# Patient Record
Sex: FEMALE | Race: WHITE | NOT HISPANIC OR LATINO | URBAN - METROPOLITAN AREA
[De-identification: names, ages, dates, MRNs, and addresses within clinical notes are randomized per-mention and may not be internally consistent; named-entity substitution may affect disease eponyms.]

---

## 2017-11-19 ENCOUNTER — EMERGENCY (EMERGENCY)
Facility: HOSPITAL | Age: 32
LOS: 1 days | Discharge: HOME | End: 2017-11-19
Attending: EMERGENCY MEDICINE | Admitting: EMERGENCY MEDICINE
Payer: COMMERCIAL

## 2017-11-19 VITALS
TEMPERATURE: 99 F | SYSTOLIC BLOOD PRESSURE: 123 MMHG | OXYGEN SATURATION: 100 % | HEART RATE: 110 BPM | RESPIRATION RATE: 20 BRPM | DIASTOLIC BLOOD PRESSURE: 72 MMHG

## 2017-11-19 DIAGNOSIS — Z98.891 HISTORY OF UTERINE SCAR FROM PREVIOUS SURGERY: Chronic | ICD-10-CM

## 2017-11-19 LAB
ALBUMIN SERPL ELPH-MCNC: 4.7 G/DL — SIGNIFICANT CHANGE UP (ref 3.3–5)
ALP SERPL-CCNC: 42 U/L — SIGNIFICANT CHANGE UP (ref 40–120)
ALT FLD-CCNC: 14 U/L RC — SIGNIFICANT CHANGE UP (ref 10–45)
ANION GAP SERPL CALC-SCNC: 13 MMOL/L — SIGNIFICANT CHANGE UP (ref 5–17)
APPEARANCE UR: CLEAR — SIGNIFICANT CHANGE UP
AST SERPL-CCNC: 13 U/L — SIGNIFICANT CHANGE UP (ref 10–40)
BACTERIA # UR AUTO: ABNORMAL /HPF
BASOPHILS # BLD AUTO: 0 K/UL — SIGNIFICANT CHANGE UP (ref 0–0.2)
BASOPHILS NFR BLD AUTO: 0.3 % — SIGNIFICANT CHANGE UP (ref 0–2)
BILIRUB SERPL-MCNC: 0.3 MG/DL — SIGNIFICANT CHANGE UP (ref 0.2–1.2)
BILIRUB UR-MCNC: NEGATIVE — SIGNIFICANT CHANGE UP
BUN SERPL-MCNC: 15 MG/DL — SIGNIFICANT CHANGE UP (ref 7–23)
CALCIUM SERPL-MCNC: 9.4 MG/DL — SIGNIFICANT CHANGE UP (ref 8.4–10.5)
CHLORIDE SERPL-SCNC: 105 MMOL/L — SIGNIFICANT CHANGE UP (ref 96–108)
CO2 SERPL-SCNC: 22 MMOL/L — SIGNIFICANT CHANGE UP (ref 22–31)
COLOR SPEC: YELLOW — SIGNIFICANT CHANGE UP
CREAT SERPL-MCNC: 0.68 MG/DL — SIGNIFICANT CHANGE UP (ref 0.5–1.3)
DIFF PNL FLD: ABNORMAL
EOSINOPHIL # BLD AUTO: 0.1 K/UL — SIGNIFICANT CHANGE UP (ref 0–0.5)
EOSINOPHIL NFR BLD AUTO: 1.3 % — SIGNIFICANT CHANGE UP (ref 0–6)
EPI CELLS # UR: SIGNIFICANT CHANGE UP /HPF
GLUCOSE SERPL-MCNC: 100 MG/DL — HIGH (ref 70–99)
GLUCOSE UR QL: NEGATIVE — SIGNIFICANT CHANGE UP
HCG SERPL-ACNC: 1167 MIU/ML — SIGNIFICANT CHANGE UP
HCT VFR BLD CALC: 42.1 % — SIGNIFICANT CHANGE UP (ref 34.5–45)
HGB BLD-MCNC: 13.9 G/DL — SIGNIFICANT CHANGE UP (ref 11.5–15.5)
KETONES UR-MCNC: NEGATIVE — SIGNIFICANT CHANGE UP
LEUKOCYTE ESTERASE UR-ACNC: ABNORMAL
LYMPHOCYTES # BLD AUTO: 1.2 K/UL — SIGNIFICANT CHANGE UP (ref 1–3.3)
LYMPHOCYTES # BLD AUTO: 12.1 % — LOW (ref 13–44)
MCHC RBC-ENTMCNC: 30.6 PG — SIGNIFICANT CHANGE UP (ref 27–34)
MCHC RBC-ENTMCNC: 33 GM/DL — SIGNIFICANT CHANGE UP (ref 32–36)
MCV RBC AUTO: 92.6 FL — SIGNIFICANT CHANGE UP (ref 80–100)
MONOCYTES # BLD AUTO: 0.4 K/UL — SIGNIFICANT CHANGE UP (ref 0–0.9)
MONOCYTES NFR BLD AUTO: 4.1 % — SIGNIFICANT CHANGE UP (ref 2–14)
NEUTROPHILS # BLD AUTO: 8.4 K/UL — HIGH (ref 1.8–7.4)
NEUTROPHILS NFR BLD AUTO: 82.1 % — HIGH (ref 43–77)
NITRITE UR-MCNC: NEGATIVE — SIGNIFICANT CHANGE UP
PH UR: 5.5 — SIGNIFICANT CHANGE UP (ref 5–8)
PLATELET # BLD AUTO: 229 K/UL — SIGNIFICANT CHANGE UP (ref 150–400)
POTASSIUM SERPL-MCNC: 3.7 MMOL/L — SIGNIFICANT CHANGE UP (ref 3.5–5.3)
POTASSIUM SERPL-SCNC: 3.7 MMOL/L — SIGNIFICANT CHANGE UP (ref 3.5–5.3)
PROT SERPL-MCNC: 7.6 G/DL — SIGNIFICANT CHANGE UP (ref 6–8.3)
PROT UR-MCNC: SIGNIFICANT CHANGE UP
RBC # BLD: 4.55 M/UL — SIGNIFICANT CHANGE UP (ref 3.8–5.2)
RBC # FLD: 11.9 % — SIGNIFICANT CHANGE UP (ref 10.3–14.5)
RBC CASTS # UR COMP ASSIST: SIGNIFICANT CHANGE UP /HPF (ref 0–2)
SODIUM SERPL-SCNC: 140 MMOL/L — SIGNIFICANT CHANGE UP (ref 135–145)
SP GR SPEC: 1.03 — HIGH (ref 1.01–1.02)
UROBILINOGEN FLD QL: NEGATIVE — SIGNIFICANT CHANGE UP
WBC # BLD: 10.3 K/UL — SIGNIFICANT CHANGE UP (ref 3.8–10.5)
WBC # FLD AUTO: 10.3 K/UL — SIGNIFICANT CHANGE UP (ref 3.8–10.5)
WBC UR QL: SIGNIFICANT CHANGE UP /HPF (ref 0–5)

## 2017-11-19 PROCEDURE — 76817 TRANSVAGINAL US OBSTETRIC: CPT

## 2017-11-19 PROCEDURE — 99285 EMERGENCY DEPT VISIT HI MDM: CPT

## 2017-11-19 PROCEDURE — 99284 EMERGENCY DEPT VISIT MOD MDM: CPT | Mod: 25

## 2017-11-19 PROCEDURE — 80053 COMPREHEN METABOLIC PANEL: CPT

## 2017-11-19 PROCEDURE — 84702 CHORIONIC GONADOTROPIN TEST: CPT

## 2017-11-19 PROCEDURE — 81001 URINALYSIS AUTO W/SCOPE: CPT

## 2017-11-19 PROCEDURE — 85027 COMPLETE CBC AUTOMATED: CPT

## 2017-11-19 PROCEDURE — 76817 TRANSVAGINAL US OBSTETRIC: CPT | Mod: 26

## 2017-11-19 RX ORDER — SODIUM CHLORIDE 9 MG/ML
1000 INJECTION INTRAMUSCULAR; INTRAVENOUS; SUBCUTANEOUS ONCE
Qty: 0 | Refills: 0 | Status: COMPLETED | OUTPATIENT
Start: 2017-11-19 | End: 2017-11-19

## 2017-11-19 RX ADMIN — SODIUM CHLORIDE 1000 MILLILITER(S): 9 INJECTION INTRAMUSCULAR; INTRAVENOUS; SUBCUTANEOUS at 16:48

## 2017-11-19 NOTE — ED ADULT NURSE NOTE - OBJECTIVE STATEMENT
32 y.o. female presents ambulatory to ED through waiting room c/o vaginal bleeding.  32 y.o. female presents ambulatory to ED through waiting room c/o vaginal bleeding.  LMP 10/2. States she has lower abd cramping since this morning as well as spotting - states she sees pale blood when she wipes, has not needed to wear pads. Denies HA, CP, SOB, NVD, fevers, chills, dysuria, hematuria, vaginal discharge. Patient undressed and placed into gown, call bell in hand and side rails up with bed in lowest position for safety. blanket provided. Comfort and safety provided.

## 2017-11-19 NOTE — ED PROVIDER NOTE - MEDICAL DECISION MAKING DETAILS
32y f 7 wks pregnant w vag spotting and cramping today. Ectopic unlikely given no hx STDs, only minimal bleeding, minimal abd tenderness. Bleed may be secondary to spontaneous ab vs threatened . Will check basic labs, ua, quant hcg, TVUS, consult obgyn pending results. Deferring pelvic exam at this time since will obtain TVUS regardless

## 2017-11-19 NOTE — ED PROVIDER NOTE - PLAN OF CARE
You were seen in the emergency department for vaginal bleeding during pregnancy. You had an ultrasound that showed no pregnancy, but it may be too early to tell what exactly is the cause. Please follow up with your obgyn in the next 7-10 days.  Return to the emergency department immediately if you experience fever, severe abdominal pain, worsening bleeding, or any other concerning symptoms.

## 2017-11-19 NOTE — ED PROVIDER NOTE - OBJECTIVE STATEMENT
32  f no medical hx here today for vag bleed. Pt says is ~7w pregnant, LMP 10/2/17. Today noted small amount of blood on toilet paper when went to urinate. Also has intermittent lower abd cramping. Denies continuous bleeding, no other vag discharge or passage of other material. No hx STDs. No fever, no dysuria. Previous pregnancy was full-term, had c/s delivery without complication. Denies cp or SOB. No nausea or vomiting. Blood type A+.   Farhad Luevano

## 2017-12-05 ENCOUNTER — RESULT REVIEW (OUTPATIENT)
Age: 32
End: 2017-12-05

## 2018-10-11 ENCOUNTER — ASOB RESULT (OUTPATIENT)
Age: 33
End: 2018-10-11

## 2018-10-11 ENCOUNTER — APPOINTMENT (OUTPATIENT)
Dept: ANTEPARTUM | Facility: CLINIC | Age: 33
End: 2018-10-11
Payer: COMMERCIAL

## 2018-10-11 PROCEDURE — 76817 TRANSVAGINAL US OBSTETRIC: CPT

## 2018-10-11 PROCEDURE — 76811 OB US DETAILED SNGL FETUS: CPT

## 2019-02-12 ENCOUNTER — OUTPATIENT (OUTPATIENT)
Dept: OUTPATIENT SERVICES | Facility: HOSPITAL | Age: 34
LOS: 1 days | End: 2019-02-12
Payer: COMMERCIAL

## 2019-02-12 DIAGNOSIS — Z98.891 HISTORY OF UTERINE SCAR FROM PREVIOUS SURGERY: Chronic | ICD-10-CM

## 2019-02-12 DIAGNOSIS — Z01.818 ENCOUNTER FOR OTHER PREPROCEDURAL EXAMINATION: ICD-10-CM

## 2019-02-12 LAB
BLD GP AB SCN SERPL QL: NEGATIVE — SIGNIFICANT CHANGE UP
HCT VFR BLD CALC: 35.2 % — SIGNIFICANT CHANGE UP (ref 34.5–45)
HGB BLD-MCNC: 11.3 G/DL — LOW (ref 11.5–15.5)
MCHC RBC-ENTMCNC: 30 PG — SIGNIFICANT CHANGE UP (ref 27–34)
MCHC RBC-ENTMCNC: 32.1 GM/DL — SIGNIFICANT CHANGE UP (ref 32–36)
MCV RBC AUTO: 93.4 FL — SIGNIFICANT CHANGE UP (ref 80–100)
PLATELET # BLD AUTO: 218 K/UL — SIGNIFICANT CHANGE UP (ref 150–400)
RBC # BLD: 3.77 M/UL — LOW (ref 3.8–5.2)
RBC # FLD: 13.7 % — SIGNIFICANT CHANGE UP (ref 10.3–14.5)
RH IG SCN BLD-IMP: POSITIVE — SIGNIFICANT CHANGE UP
WBC # BLD: 7.34 K/UL — SIGNIFICANT CHANGE UP (ref 3.8–10.5)
WBC # FLD AUTO: 7.34 K/UL — SIGNIFICANT CHANGE UP (ref 3.8–10.5)

## 2019-02-12 PROCEDURE — 86850 RBC ANTIBODY SCREEN: CPT

## 2019-02-12 PROCEDURE — 86900 BLOOD TYPING SEROLOGIC ABO: CPT

## 2019-02-12 PROCEDURE — 85027 COMPLETE CBC AUTOMATED: CPT

## 2019-02-12 PROCEDURE — 86901 BLOOD TYPING SEROLOGIC RH(D): CPT

## 2019-02-12 PROCEDURE — G0463: CPT

## 2019-02-12 RX ORDER — CEFAZOLIN SODIUM 1 G
3000 VIAL (EA) INJECTION ONCE
Qty: 0 | Refills: 0 | Status: DISCONTINUED | OUTPATIENT
Start: 2019-02-26 | End: 2019-03-01

## 2019-02-12 RX ORDER — METOCLOPRAMIDE HCL 10 MG
10 TABLET ORAL ONCE
Qty: 0 | Refills: 0 | Status: DISCONTINUED | OUTPATIENT
Start: 2019-02-26 | End: 2019-03-01

## 2019-02-12 RX ORDER — SODIUM CHLORIDE 9 MG/ML
1000 INJECTION, SOLUTION INTRAVENOUS
Qty: 0 | Refills: 0 | Status: DISCONTINUED | OUTPATIENT
Start: 2019-02-26 | End: 2019-03-01

## 2019-02-25 ENCOUNTER — TRANSCRIPTION ENCOUNTER (OUTPATIENT)
Age: 34
End: 2019-02-25

## 2019-02-26 ENCOUNTER — INPATIENT (INPATIENT)
Facility: HOSPITAL | Age: 34
LOS: 2 days | Discharge: HOME | End: 2019-03-01
Attending: OBSTETRICS & GYNECOLOGY | Admitting: OBSTETRICS & GYNECOLOGY
Payer: COMMERCIAL

## 2019-02-26 VITALS
WEIGHT: 136.69 LBS | HEART RATE: 88 BPM | TEMPERATURE: 97 F | DIASTOLIC BLOOD PRESSURE: 68 MMHG | OXYGEN SATURATION: 98 % | SYSTOLIC BLOOD PRESSURE: 117 MMHG | RESPIRATION RATE: 18 BRPM

## 2019-02-26 VITALS — WEIGHT: 134.48 LBS | HEIGHT: 61 IN

## 2019-02-26 DIAGNOSIS — Z98.891 HISTORY OF UTERINE SCAR FROM PREVIOUS SURGERY: Chronic | ICD-10-CM

## 2019-02-26 LAB
BLD GP AB SCN SERPL QL: NEGATIVE — SIGNIFICANT CHANGE UP
RH IG SCN BLD-IMP: POSITIVE — SIGNIFICANT CHANGE UP
T PALLIDUM AB TITR SER: NEGATIVE — SIGNIFICANT CHANGE UP

## 2019-02-26 RX ORDER — CITRIC ACID/SODIUM CITRATE 300-500 MG
15 SOLUTION, ORAL ORAL ONCE
Qty: 0 | Refills: 0 | Status: COMPLETED | OUTPATIENT
Start: 2019-02-26 | End: 2019-02-26

## 2019-02-26 RX ORDER — SODIUM CHLORIDE 9 MG/ML
1000 INJECTION, SOLUTION INTRAVENOUS
Qty: 0 | Refills: 0 | Status: DISCONTINUED | OUTPATIENT
Start: 2019-02-26 | End: 2019-03-01

## 2019-02-26 RX ORDER — IBUPROFEN 200 MG
600 TABLET ORAL EVERY 6 HOURS
Qty: 0 | Refills: 0 | Status: COMPLETED | OUTPATIENT
Start: 2019-02-26 | End: 2020-01-25

## 2019-02-26 RX ORDER — ACETAMINOPHEN 500 MG
975 TABLET ORAL EVERY 6 HOURS
Qty: 0 | Refills: 0 | Status: DISCONTINUED | OUTPATIENT
Start: 2019-02-26 | End: 2019-03-01

## 2019-02-26 RX ORDER — KETOROLAC TROMETHAMINE 30 MG/ML
30 SYRINGE (ML) INJECTION EVERY 6 HOURS
Qty: 0 | Refills: 0 | Status: DISCONTINUED | OUTPATIENT
Start: 2019-02-26 | End: 2019-02-27

## 2019-02-26 RX ORDER — OXYTOCIN 10 UNIT/ML
41.67 VIAL (ML) INJECTION
Qty: 20 | Refills: 0 | Status: DISCONTINUED | OUTPATIENT
Start: 2019-02-26 | End: 2019-03-01

## 2019-02-26 RX ORDER — FERROUS SULFATE 325(65) MG
325 TABLET ORAL DAILY
Qty: 0 | Refills: 0 | Status: DISCONTINUED | OUTPATIENT
Start: 2019-02-26 | End: 2019-02-27

## 2019-02-26 RX ORDER — DOCUSATE SODIUM 100 MG
100 CAPSULE ORAL
Qty: 0 | Refills: 0 | Status: DISCONTINUED | OUTPATIENT
Start: 2019-02-26 | End: 2019-02-27

## 2019-02-26 RX ORDER — GLYCERIN ADULT
1 SUPPOSITORY, RECTAL RECTAL AT BEDTIME
Qty: 0 | Refills: 0 | Status: DISCONTINUED | OUTPATIENT
Start: 2019-02-26 | End: 2019-03-01

## 2019-02-26 RX ORDER — ACETAMINOPHEN 500 MG
1000 TABLET ORAL ONCE
Qty: 0 | Refills: 0 | Status: COMPLETED | OUTPATIENT
Start: 2019-02-26 | End: 2019-02-26

## 2019-02-26 RX ORDER — DEXAMETHASONE 0.5 MG/5ML
4 ELIXIR ORAL EVERY 6 HOURS
Qty: 0 | Refills: 0 | Status: DISCONTINUED | OUTPATIENT
Start: 2019-02-26 | End: 2019-02-28

## 2019-02-26 RX ORDER — ONDANSETRON 8 MG/1
4 TABLET, FILM COATED ORAL EVERY 6 HOURS
Qty: 0 | Refills: 0 | Status: DISCONTINUED | OUTPATIENT
Start: 2019-02-26 | End: 2019-02-28

## 2019-02-26 RX ORDER — SODIUM CHLORIDE 9 MG/ML
1000 INJECTION, SOLUTION INTRAVENOUS ONCE
Qty: 0 | Refills: 0 | Status: COMPLETED | OUTPATIENT
Start: 2019-02-26 | End: 2019-02-26

## 2019-02-26 RX ORDER — SIMETHICONE 80 MG/1
80 TABLET, CHEWABLE ORAL EVERY 4 HOURS
Qty: 0 | Refills: 0 | Status: DISCONTINUED | OUTPATIENT
Start: 2019-02-26 | End: 2019-03-01

## 2019-02-26 RX ORDER — OXYCODONE HYDROCHLORIDE 5 MG/1
5 TABLET ORAL
Qty: 0 | Refills: 0 | Status: COMPLETED | OUTPATIENT
Start: 2019-02-26 | End: 2019-03-05

## 2019-02-26 RX ORDER — FAMOTIDINE 10 MG/ML
20 INJECTION INTRAVENOUS ONCE
Qty: 0 | Refills: 0 | Status: COMPLETED | OUTPATIENT
Start: 2019-02-26 | End: 2019-02-26

## 2019-02-26 RX ORDER — NALOXONE HYDROCHLORIDE 4 MG/.1ML
0.1 SPRAY NASAL
Qty: 0 | Refills: 0 | Status: DISCONTINUED | OUTPATIENT
Start: 2019-02-26 | End: 2019-02-28

## 2019-02-26 RX ORDER — OXYTOCIN 10 UNIT/ML
333.33 VIAL (ML) INJECTION
Qty: 20 | Refills: 0 | Status: DISCONTINUED | OUTPATIENT
Start: 2019-02-26 | End: 2019-03-01

## 2019-02-26 RX ORDER — DIPHENHYDRAMINE HCL 50 MG
25 CAPSULE ORAL EVERY 6 HOURS
Qty: 0 | Refills: 0 | Status: DISCONTINUED | OUTPATIENT
Start: 2019-02-26 | End: 2019-03-01

## 2019-02-26 RX ORDER — HEPARIN SODIUM 5000 [USP'U]/ML
5000 INJECTION INTRAVENOUS; SUBCUTANEOUS EVERY 12 HOURS
Qty: 0 | Refills: 0 | Status: DISCONTINUED | OUTPATIENT
Start: 2019-02-26 | End: 2019-03-01

## 2019-02-26 RX ORDER — LANOLIN
1 OINTMENT (GRAM) TOPICAL
Qty: 0 | Refills: 0 | Status: DISCONTINUED | OUTPATIENT
Start: 2019-02-26 | End: 2019-03-01

## 2019-02-26 RX ORDER — OXYCODONE HYDROCHLORIDE 5 MG/1
5 TABLET ORAL EVERY 4 HOURS
Qty: 0 | Refills: 0 | Status: COMPLETED | OUTPATIENT
Start: 2019-02-26 | End: 2019-03-05

## 2019-02-26 RX ORDER — TETANUS TOXOID, REDUCED DIPHTHERIA TOXOID AND ACELLULAR PERTUSSIS VACCINE, ADSORBED 5; 2.5; 8; 8; 2.5 [IU]/.5ML; [IU]/.5ML; UG/.5ML; UG/.5ML; UG/.5ML
0.5 SUSPENSION INTRAMUSCULAR ONCE
Qty: 0 | Refills: 0 | Status: DISCONTINUED | OUTPATIENT
Start: 2019-02-26 | End: 2019-03-01

## 2019-02-26 RX ADMIN — SODIUM CHLORIDE 125 MILLILITER(S): 9 INJECTION, SOLUTION INTRAVENOUS at 20:15

## 2019-02-26 RX ADMIN — SODIUM CHLORIDE 2000 MILLILITER(S): 9 INJECTION, SOLUTION INTRAVENOUS at 09:15

## 2019-02-26 RX ADMIN — HEPARIN SODIUM 5000 UNIT(S): 5000 INJECTION INTRAVENOUS; SUBCUTANEOUS at 20:16

## 2019-02-26 RX ADMIN — FAMOTIDINE 20 MILLIGRAM(S): 10 INJECTION INTRAVENOUS at 10:33

## 2019-02-26 RX ADMIN — Medication 975 MILLIGRAM(S): at 23:15

## 2019-02-26 RX ADMIN — Medication 30 MILLIGRAM(S): at 20:15

## 2019-02-26 RX ADMIN — Medication 100 MILLIGRAM(S): at 20:16

## 2019-02-26 RX ADMIN — SIMETHICONE 80 MILLIGRAM(S): 80 TABLET, CHEWABLE ORAL at 23:15

## 2019-02-26 RX ADMIN — Medication 400 MILLIGRAM(S): at 16:22

## 2019-02-26 RX ADMIN — Medication 1000 MILLIUNIT(S)/MIN: at 11:56

## 2019-02-26 RX ADMIN — Medication 15 MILLILITER(S): at 10:33

## 2019-02-26 RX ADMIN — SODIUM CHLORIDE 125 MILLILITER(S): 9 INJECTION, SOLUTION INTRAVENOUS at 09:43

## 2019-02-26 RX ADMIN — Medication 30 MILLIGRAM(S): at 21:00

## 2019-02-26 RX ADMIN — Medication 30 MILLIGRAM(S): at 14:01

## 2019-02-26 NOTE — PRE-ANESTHESIA EVALUATION ADULT - NSANTHOSAYNRD_GEN_A_CORE
No. SUSHILA screening performed.  STOP BANG Legend: 0-2 = LOW Risk; 3-4 = INTERMEDIATE Risk; 5-8 = HIGH Risk

## 2019-02-26 NOTE — PATIENT PROFILE OB - ALERT: PERTINENT HISTORY
1st Trimester Sonogram/20 Week Level II Sonogram/BioPhysical Profile(s)/Non Invasive Prenatal Screen (NIPS)/Follow up Sonogram for Growth/Fetal Non-Stress Test (NST)/Ultra Screen at 12 Weeks

## 2019-02-27 LAB
HCT VFR BLD CALC: 29 % — LOW (ref 34.5–45)
HGB BLD-MCNC: 9.4 G/DL — LOW (ref 11.5–15.5)
MCHC RBC-ENTMCNC: 29.2 PG — SIGNIFICANT CHANGE UP (ref 27–34)
MCHC RBC-ENTMCNC: 32.5 GM/DL — SIGNIFICANT CHANGE UP (ref 32–36)
MCV RBC AUTO: 89.9 FL — SIGNIFICANT CHANGE UP (ref 80–100)
PLATELET # BLD AUTO: 163 K/UL — SIGNIFICANT CHANGE UP (ref 150–400)
RBC # BLD: 3.23 M/UL — LOW (ref 3.8–5.2)
RBC # FLD: 12.1 % — SIGNIFICANT CHANGE UP (ref 10.3–14.5)
WBC # BLD: 8.6 K/UL — SIGNIFICANT CHANGE UP (ref 3.8–10.5)
WBC # FLD AUTO: 8.6 K/UL — SIGNIFICANT CHANGE UP (ref 3.8–10.5)

## 2019-02-27 RX ORDER — KETOROLAC TROMETHAMINE 30 MG/ML
30 SYRINGE (ML) INJECTION EVERY 6 HOURS
Qty: 0 | Refills: 0 | Status: DISCONTINUED | OUTPATIENT
Start: 2019-02-27 | End: 2019-02-28

## 2019-02-27 RX ORDER — ASCORBIC ACID 60 MG
500 TABLET,CHEWABLE ORAL DAILY
Qty: 0 | Refills: 0 | Status: DISCONTINUED | OUTPATIENT
Start: 2019-02-27 | End: 2019-03-01

## 2019-02-27 RX ORDER — DOCUSATE SODIUM 100 MG
100 CAPSULE ORAL
Qty: 0 | Refills: 0 | Status: DISCONTINUED | OUTPATIENT
Start: 2019-02-27 | End: 2019-03-01

## 2019-02-27 RX ORDER — BENZOCAINE AND MENTHOL 5; 1 G/100ML; G/100ML
1 LIQUID ORAL THREE TIMES A DAY
Qty: 0 | Refills: 0 | Status: DISCONTINUED | OUTPATIENT
Start: 2019-02-27 | End: 2019-03-01

## 2019-02-27 RX ORDER — FERROUS SULFATE 325(65) MG
325 TABLET ORAL
Qty: 0 | Refills: 0 | Status: DISCONTINUED | OUTPATIENT
Start: 2019-02-27 | End: 2019-03-01

## 2019-02-27 RX ADMIN — Medication 975 MILLIGRAM(S): at 11:59

## 2019-02-27 RX ADMIN — Medication 100 MILLIGRAM(S): at 17:40

## 2019-02-27 RX ADMIN — Medication 500 MILLIGRAM(S): at 11:35

## 2019-02-27 RX ADMIN — HEPARIN SODIUM 5000 UNIT(S): 5000 INJECTION INTRAVENOUS; SUBCUTANEOUS at 08:05

## 2019-02-27 RX ADMIN — ONDANSETRON 4 MILLIGRAM(S): 8 TABLET, FILM COATED ORAL at 15:12

## 2019-02-27 RX ADMIN — Medication 975 MILLIGRAM(S): at 17:40

## 2019-02-27 RX ADMIN — Medication 30 MILLIGRAM(S): at 15:12

## 2019-02-27 RX ADMIN — Medication 975 MILLIGRAM(S): at 06:30

## 2019-02-27 RX ADMIN — Medication 1 TABLET(S): at 11:35

## 2019-02-27 RX ADMIN — Medication 325 MILLIGRAM(S): at 08:05

## 2019-02-27 RX ADMIN — Medication 975 MILLIGRAM(S): at 00:15

## 2019-02-27 RX ADMIN — Medication 30 MILLIGRAM(S): at 03:00

## 2019-02-27 RX ADMIN — SODIUM CHLORIDE 125 MILLILITER(S): 9 INJECTION, SOLUTION INTRAVENOUS at 17:40

## 2019-02-27 RX ADMIN — Medication 30 MILLIGRAM(S): at 02:11

## 2019-02-27 RX ADMIN — Medication 30 MILLIGRAM(S): at 23:44

## 2019-02-27 RX ADMIN — Medication 30 MILLIGRAM(S): at 08:30

## 2019-02-27 RX ADMIN — BENZOCAINE AND MENTHOL 1 LOZENGE: 5; 1 LIQUID ORAL at 22:01

## 2019-02-27 RX ADMIN — Medication 975 MILLIGRAM(S): at 18:10

## 2019-02-27 RX ADMIN — Medication 975 MILLIGRAM(S): at 05:55

## 2019-02-27 RX ADMIN — Medication 30 MILLIGRAM(S): at 08:05

## 2019-02-27 RX ADMIN — SODIUM CHLORIDE 125 MILLILITER(S): 9 INJECTION, SOLUTION INTRAVENOUS at 11:35

## 2019-02-27 RX ADMIN — Medication 325 MILLIGRAM(S): at 17:40

## 2019-02-27 RX ADMIN — Medication 100 MILLIGRAM(S): at 08:05

## 2019-02-27 RX ADMIN — Medication 30 MILLIGRAM(S): at 15:45

## 2019-02-27 RX ADMIN — Medication 975 MILLIGRAM(S): at 11:35

## 2019-02-27 RX ADMIN — HEPARIN SODIUM 5000 UNIT(S): 5000 INJECTION INTRAVENOUS; SUBCUTANEOUS at 20:40

## 2019-02-28 DIAGNOSIS — D50.0 IRON DEFICIENCY ANEMIA SECONDARY TO BLOOD LOSS (CHRONIC): ICD-10-CM

## 2019-02-28 RX ORDER — IBUPROFEN 200 MG
600 TABLET ORAL EVERY 6 HOURS
Qty: 0 | Refills: 0 | Status: DISCONTINUED | OUTPATIENT
Start: 2019-02-28 | End: 2019-03-01

## 2019-02-28 RX ORDER — OXYCODONE HYDROCHLORIDE 5 MG/1
5 TABLET ORAL EVERY 4 HOURS
Qty: 0 | Refills: 0 | Status: DISCONTINUED | OUTPATIENT
Start: 2019-02-28 | End: 2019-03-01

## 2019-02-28 RX ORDER — OXYCODONE HYDROCHLORIDE 5 MG/1
5 TABLET ORAL
Qty: 0 | Refills: 0 | Status: DISCONTINUED | OUTPATIENT
Start: 2019-02-28 | End: 2019-03-01

## 2019-02-28 RX ADMIN — Medication 325 MILLIGRAM(S): at 20:11

## 2019-02-28 RX ADMIN — Medication 30 MILLIGRAM(S): at 00:40

## 2019-02-28 RX ADMIN — Medication 600 MILLIGRAM(S): at 18:12

## 2019-02-28 RX ADMIN — Medication 500 MILLIGRAM(S): at 08:57

## 2019-02-28 RX ADMIN — Medication 975 MILLIGRAM(S): at 16:13

## 2019-02-28 RX ADMIN — Medication 975 MILLIGRAM(S): at 11:26

## 2019-02-28 RX ADMIN — Medication 975 MILLIGRAM(S): at 22:00

## 2019-02-28 RX ADMIN — Medication 1 TABLET(S): at 10:27

## 2019-02-28 RX ADMIN — Medication 975 MILLIGRAM(S): at 10:27

## 2019-02-28 RX ADMIN — Medication 30 MILLIGRAM(S): at 05:38

## 2019-02-28 RX ADMIN — Medication 325 MILLIGRAM(S): at 08:57

## 2019-02-28 RX ADMIN — Medication 975 MILLIGRAM(S): at 17:00

## 2019-02-28 RX ADMIN — Medication 975 MILLIGRAM(S): at 22:30

## 2019-02-28 RX ADMIN — HEPARIN SODIUM 5000 UNIT(S): 5000 INJECTION INTRAVENOUS; SUBCUTANEOUS at 10:27

## 2019-02-28 RX ADMIN — HEPARIN SODIUM 5000 UNIT(S): 5000 INJECTION INTRAVENOUS; SUBCUTANEOUS at 20:11

## 2019-02-28 RX ADMIN — Medication 100 MILLIGRAM(S): at 08:57

## 2019-02-28 RX ADMIN — Medication 600 MILLIGRAM(S): at 17:22

## 2019-02-28 RX ADMIN — Medication 600 MILLIGRAM(S): at 12:15

## 2019-02-28 RX ADMIN — Medication 600 MILLIGRAM(S): at 11:28

## 2019-02-28 RX ADMIN — Medication 100 MILLIGRAM(S): at 20:11

## 2019-03-01 ENCOUNTER — TRANSCRIPTION ENCOUNTER (OUTPATIENT)
Age: 34
End: 2019-03-01

## 2019-03-01 VITALS
SYSTOLIC BLOOD PRESSURE: 125 MMHG | TEMPERATURE: 98 F | OXYGEN SATURATION: 98 % | DIASTOLIC BLOOD PRESSURE: 81 MMHG | RESPIRATION RATE: 18 BRPM | HEART RATE: 70 BPM

## 2019-03-01 LAB
HCT VFR BLD CALC: 28 % — LOW (ref 34.5–45)
HGB BLD-MCNC: 9.7 G/DL — LOW (ref 11.5–15.5)
MCHC RBC-ENTMCNC: 31.6 PG — SIGNIFICANT CHANGE UP (ref 27–34)
MCHC RBC-ENTMCNC: 34.7 GM/DL — SIGNIFICANT CHANGE UP (ref 32–36)
MCV RBC AUTO: 91.2 FL — SIGNIFICANT CHANGE UP (ref 80–100)
PLATELET # BLD AUTO: 179 K/UL — SIGNIFICANT CHANGE UP (ref 150–400)
RBC # BLD: 3.07 M/UL — LOW (ref 3.8–5.2)
RBC # FLD: 12.5 % — SIGNIFICANT CHANGE UP (ref 10.3–14.5)
WBC # BLD: 8 K/UL — SIGNIFICANT CHANGE UP (ref 3.8–10.5)
WBC # FLD AUTO: 8 K/UL — SIGNIFICANT CHANGE UP (ref 3.8–10.5)

## 2019-03-01 PROCEDURE — 86900 BLOOD TYPING SEROLOGIC ABO: CPT

## 2019-03-01 PROCEDURE — 59050 FETAL MONITOR W/REPORT: CPT

## 2019-03-01 PROCEDURE — 86901 BLOOD TYPING SEROLOGIC RH(D): CPT

## 2019-03-01 PROCEDURE — 85027 COMPLETE CBC AUTOMATED: CPT

## 2019-03-01 PROCEDURE — 86780 TREPONEMA PALLIDUM: CPT

## 2019-03-01 PROCEDURE — 59025 FETAL NON-STRESS TEST: CPT

## 2019-03-01 PROCEDURE — 86850 RBC ANTIBODY SCREEN: CPT

## 2019-03-01 RX ORDER — LEVOTHYROXINE SODIUM 125 MCG
50 TABLET ORAL DAILY
Qty: 0 | Refills: 0 | Status: DISCONTINUED | OUTPATIENT
Start: 2019-03-01 | End: 2019-03-01

## 2019-03-01 RX ORDER — FERROUS SULFATE 325(65) MG
1 TABLET ORAL
Qty: 30 | Refills: 0 | OUTPATIENT
Start: 2019-03-01 | End: 2019-03-30

## 2019-03-01 RX ORDER — ACETAMINOPHEN 500 MG
3 TABLET ORAL
Qty: 0 | Refills: 0 | COMMUNITY
Start: 2019-03-01

## 2019-03-01 RX ORDER — LEVOTHYROXINE SODIUM 125 MCG
1 TABLET ORAL
Qty: 0 | Refills: 0 | COMMUNITY

## 2019-03-01 RX ORDER — IBUPROFEN 200 MG
1 TABLET ORAL
Qty: 0 | Refills: 0 | COMMUNITY
Start: 2019-03-01

## 2019-03-01 RX ADMIN — Medication 600 MILLIGRAM(S): at 06:15

## 2019-03-01 RX ADMIN — Medication 325 MILLIGRAM(S): at 08:42

## 2019-03-01 RX ADMIN — Medication 600 MILLIGRAM(S): at 05:45

## 2019-03-01 RX ADMIN — Medication 975 MILLIGRAM(S): at 06:15

## 2019-03-01 RX ADMIN — Medication 600 MILLIGRAM(S): at 01:00

## 2019-03-01 RX ADMIN — Medication 975 MILLIGRAM(S): at 05:45

## 2019-03-01 RX ADMIN — HEPARIN SODIUM 5000 UNIT(S): 5000 INJECTION INTRAVENOUS; SUBCUTANEOUS at 08:42

## 2019-03-01 RX ADMIN — Medication 600 MILLIGRAM(S): at 00:24

## 2019-03-01 RX ADMIN — Medication 100 MILLIGRAM(S): at 05:44

## 2019-03-01 NOTE — DISCHARGE NOTE OB - CARE PLAN
Principal Discharge DX:	 delivery delivered  Goal:	d/c home  Assessment and plan of treatment:	postop 2 weeks

## 2019-03-01 NOTE — PROGRESS NOTE ADULT - ATTENDING COMMENTS
PT FOR D/C TODAY. WOULD LIKE SOME OXY FOR D/C  VSS  INCISION C/D/I  INSTR GIVEN  POSTOP 2 WEEKS    PERRY HARRIS
note appreciated  vss  abd soft, non tender  fundus firm  lochia mild  to  mod  incision c/d/i  doing well  supp care.

## 2019-03-01 NOTE — PROGRESS NOTE ADULT - SUBJECTIVE AND OBJECTIVE BOX
Day 2 of Anesthesia Pain Management Service    SUBJECTIVE: I'm okay  Pain Scale Score:    [X] Refer to charted pain scores    THERAPY: Epidural Bupivacaine 0.01 % and Fentanyl 3 micrograms/mL     Demand Dose: 3 mL  Lockout: 15 minutes   Continuous Rate:  12 mL    MEDICATIONS  (STANDING):  acetaminophen   Tablet .. 975 milliGRAM(s) Oral every 6 hours  ascorbic acid 500 milliGRAM(s) Oral daily  ceFAZolin   IVPB 3000 milliGRAM(s) IV Intermittent once  diphtheria/tetanus/pertussis (acellular) Vaccine (ADAcel) 0.5 milliLiter(s) IntraMuscular once  docusate sodium 100 milliGRAM(s) Oral two times a day  fentaNYL (3 MICROgram(s)/mL) + BUpivacaine 0.01% in 0.9% Sodium Chloride PCEA 250 milliLiter(s) Epidural PCA Continuous  ferrous    sulfate 325 milliGRAM(s) Oral two times a day  heparin  Injectable 5000 Unit(s) SubCutaneous every 12 hours  ibuprofen  Tablet. 600 milliGRAM(s) Oral every 6 hours  ketorolac   Injectable 30 milliGRAM(s) IV Push every 6 hours  lactated ringers. 1000 milliLiter(s) (125 mL/Hr) IV Continuous <Continuous>  lactated ringers. 1000 milliLiter(s) (125 mL/Hr) IV Continuous <Continuous>  lactated ringers. 1000 milliLiter(s) (125 mL/Hr) IV Continuous <Continuous>  oxyCODONE    IR 5 milliGRAM(s) Oral every 3 hours  oxytocin Infusion 41.667 milliUNIT(s)/Min (125 mL/Hr) IV Continuous <Continuous>  oxytocin Infusion 333.333 milliUNIT(s)/Min (1000 mL/Hr) IV Continuous <Continuous>  oxytocin Infusion 41.667 milliUNIT(s)/Min (125 mL/Hr) IV Continuous <Continuous>  prenatal multivitamin 1 Tablet(s) Oral daily    MEDICATIONS  (PRN):  benzocaine 15 mG/menthol 3.6 mG Lozenge 1 Lozenge Oral three times a day PRN Sore Throat  dexamethasone  Injectable 4 milliGRAM(s) IV Push every 6 hours PRN Nausea, IF ondansetron is ineffective after 30 - 60 minutes  diphenhydrAMINE 25 milliGRAM(s) Oral every 6 hours PRN Itching  fentaNYL (3 MICROgram(s)/mL) + BUpivacaine 0.01% in 0.9% Sodium Chloride PCEA Rescue Clinician Bolus 5 milliLiter(s) Epidural every 15 minutes PRN Mild Pain (1 - 3)  glycerin Suppository - Adult 1 Suppository(s) Rectal at bedtime PRN Constipation  lanolin Ointment 1 Application(s) Topical every 3 hours PRN Sore Nipples  metoclopramide Injectable 10 milliGRAM(s) IV Push once PRN Nausea and/or Vomiting  naloxone Injectable 0.1 milliGRAM(s) IV Push every 3 minutes PRN For ANY of the following changes in patient status:  A. RR LESS THAN 10 breaths per minute, B. Oxygen saturation LESS THAN 90%, C. Sedation score of 6  ondansetron Injectable 4 milliGRAM(s) IV Push every 6 hours PRN Nausea  oxyCODONE    IR 5 milliGRAM(s) Oral every 4 hours PRN Severe Pain (7 - 10)  simethicone 80 milliGRAM(s) Chew every 4 hours PRN Gas      OBJECTIVE:    Assessment of Epidural Catheter Site: 	    [ ] Dressing intact	[X] Site non-tender	[X] Site without erythema, discharge, edema  [ ] Epidural tubing and connection checked	[X] Gross neurological exam within normal limits  [X] Catheter removed                          9.4    8.6   )-----------( 163      ( 27 Feb 2019 05:44 )             29.0     Vital Signs Last 24 Hrs  T(C): 36.7 (02-28-19 @ 05:00), Max: 36.8 (02-27-19 @ 13:11)  T(F): 98 (02-28-19 @ 05:00), Max: 98.3 (02-27-19 @ 13:11)  HR: 73 (02-28-19 @ 05:00) (68 - 86)  BP: 106/69 (02-28-19 @ 05:00) (99/65 - 115/70)  BP(mean): --  RR: 18 (02-28-19 @ 05:00) (18 - 18)  SpO2: 96% (02-28-19 @ 05:00) (96% - 99%)      Sedation Score:	[X] Alert	[ ] Drowsy	[ ] Arousable  [ ] Asleep  [ ] Unresponsive    Side Effects:	[X] None	[ ] Nausea	[ ] Vomiting  [ ] Pruritus  		[ ] Weakness  [ ] Numbness  [ ] Other:    ASSESSMENT/ PLAN:    Therapy:                         [ ] Continue   [X] Discontinue   [X] Change to PRN Analgesics    Documentation and Verification of current medications:  [X] Done	[ ] Not done, not eligible, reason:    Comments:
Day 1 of Anesthesia Pain Management Service    SUBJECTIVE: I'm okay  Pain Scale Score:    [X] Refer to charted pain scores    THERAPY: Epidural Bupivacaine 0.01 % and Fentanyl 3 micrograms/mL     Demand Dose: 3 mL  Lockout: 15 minutes   Continuous Rate:  10 mL    MEDICATIONS  (STANDING):  acetaminophen   Tablet .. 975 milliGRAM(s) Oral every 6 hours  ascorbic acid 500 milliGRAM(s) Oral daily  ceFAZolin   IVPB 3000 milliGRAM(s) IV Intermittent once  diphtheria/tetanus/pertussis (acellular) Vaccine (ADAcel) 0.5 milliLiter(s) IntraMuscular once  docusate sodium 100 milliGRAM(s) Oral two times a day  fentaNYL (3 MICROgram(s)/mL) + BUpivacaine 0.01% in 0.9% Sodium Chloride PCEA 250 milliLiter(s) Epidural PCA Continuous  ferrous    sulfate 325 milliGRAM(s) Oral two times a day  heparin  Injectable 5000 Unit(s) SubCutaneous every 12 hours  ibuprofen  Tablet. 600 milliGRAM(s) Oral every 6 hours  lactated ringers. 1000 milliLiter(s) (125 mL/Hr) IV Continuous <Continuous>  lactated ringers. 1000 milliLiter(s) (125 mL/Hr) IV Continuous <Continuous>  lactated ringers. 1000 milliLiter(s) (125 mL/Hr) IV Continuous <Continuous>  oxyCODONE    IR 5 milliGRAM(s) Oral every 3 hours  oxytocin Infusion 41.667 milliUNIT(s)/Min (125 mL/Hr) IV Continuous <Continuous>  oxytocin Infusion 333.333 milliUNIT(s)/Min (1000 mL/Hr) IV Continuous <Continuous>  oxytocin Infusion 41.667 milliUNIT(s)/Min (125 mL/Hr) IV Continuous <Continuous>  prenatal multivitamin 1 Tablet(s) Oral daily    MEDICATIONS  (PRN):  dexamethasone  Injectable 4 milliGRAM(s) IV Push every 6 hours PRN Nausea, IF ondansetron is ineffective after 30 - 60 minutes  diphenhydrAMINE 25 milliGRAM(s) Oral every 6 hours PRN Itching  fentaNYL (3 MICROgram(s)/mL) + BUpivacaine 0.01% in 0.9% Sodium Chloride PCEA Rescue Clinician Bolus 5 milliLiter(s) Epidural every 15 minutes PRN Mild Pain (1 - 3)  glycerin Suppository - Adult 1 Suppository(s) Rectal at bedtime PRN Constipation  lanolin Ointment 1 Application(s) Topical every 3 hours PRN Sore Nipples  metoclopramide Injectable 10 milliGRAM(s) IV Push once PRN Nausea and/or Vomiting  naloxone Injectable 0.1 milliGRAM(s) IV Push every 3 minutes PRN For ANY of the following changes in patient status:  A. RR LESS THAN 10 breaths per minute, B. Oxygen saturation LESS THAN 90%, C. Sedation score of 6  ondansetron Injectable 4 milliGRAM(s) IV Push every 6 hours PRN Nausea  oxyCODONE    IR 5 milliGRAM(s) Oral every 4 hours PRN Severe Pain (7 - 10)  simethicone 80 milliGRAM(s) Chew every 4 hours PRN Gas      OBJECTIVE:    Assessment of Epidural Catheter Site: 	    [X] Dressing intact	[X] Site non-tender	[X] Site without erythema, discharge, edema  [X] Epidural tubing and connection checked	[X] Gross neurological exam within normal limits  [ ] Catheter removed – tip intact		                          9.4    8.6   )-----------( 163      ( 27 Feb 2019 05:44 )             29.0     Vital Signs Last 24 Hrs  T(C): 36.6 (02-27-19 @ 05:00), Max: 36.8 (02-26-19 @ 19:00)  T(F): 97.9 (02-27-19 @ 05:00), Max: 98.2 (02-26-19 @ 19:00)  HR: 77 (02-27-19 @ 05:00) (68 - 89)  BP: 107/69 (02-27-19 @ 05:00) (89/67 - 133/57)  BP(mean): 74 (02-26-19 @ 15:35) (72 - 87)  RR: 18 (02-27-19 @ 05:00) (13 - 23)  SpO2: 97% (02-27-19 @ 05:00) (96% - 100%)      Sedation Score:	[X] Alert	[ ] Drowsy	[ ] Arousable  [ ] Asleep  [ ] Unresponsive    Side Effects:	[X] None	[ ] Nausea	[ ] Vomiting  [ ] Pruritus  		[ ] Weakness  [ ] Numbness  [ ] Other:    ASSESSMENT/ PLAN:    Therapy:                         [X] Continue   [ ] Discontinue   [ ] Change to PRN Analgesics    Documentation and Verification of current medications:  [X] Done	[ ] Not done, not eligible, reason:    Comments:
Day 1 of Anesthesia Pain Management Service    SUBJECTIVE: I'm okay  Pain Scale Score:    [X] Refer to charted pain scores    THERAPY: Epidural Bupivacaine 0.01 % and Fentanyl 3 micrograms/mL     Demand Dose: 3 mL  Lockout: 15 minutes   Continuous Rate:  12 mL    MEDICATIONS  (STANDING):  acetaminophen   Tablet .. 975 milliGRAM(s) Oral every 6 hours  ascorbic acid 500 milliGRAM(s) Oral daily  ceFAZolin   IVPB 3000 milliGRAM(s) IV Intermittent once  diphtheria/tetanus/pertussis (acellular) Vaccine (ADAcel) 0.5 milliLiter(s) IntraMuscular once  docusate sodium 100 milliGRAM(s) Oral two times a day  fentaNYL (3 MICROgram(s)/mL) + BUpivacaine 0.01% in 0.9% Sodium Chloride PCEA 250 milliLiter(s) Epidural PCA Continuous  ferrous    sulfate 325 milliGRAM(s) Oral two times a day  heparin  Injectable 5000 Unit(s) SubCutaneous every 12 hours  ibuprofen  Tablet. 600 milliGRAM(s) Oral every 6 hours  lactated ringers. 1000 milliLiter(s) (125 mL/Hr) IV Continuous <Continuous>  lactated ringers. 1000 milliLiter(s) (125 mL/Hr) IV Continuous <Continuous>  lactated ringers. 1000 milliLiter(s) (125 mL/Hr) IV Continuous <Continuous>  oxyCODONE    IR 5 milliGRAM(s) Oral every 3 hours  oxytocin Infusion 41.667 milliUNIT(s)/Min (125 mL/Hr) IV Continuous <Continuous>  oxytocin Infusion 333.333 milliUNIT(s)/Min (1000 mL/Hr) IV Continuous <Continuous>  oxytocin Infusion 41.667 milliUNIT(s)/Min (125 mL/Hr) IV Continuous <Continuous>  prenatal multivitamin 1 Tablet(s) Oral daily    MEDICATIONS  (PRN):  dexamethasone  Injectable 4 milliGRAM(s) IV Push every 6 hours PRN Nausea, IF ondansetron is ineffective after 30 - 60 minutes  diphenhydrAMINE 25 milliGRAM(s) Oral every 6 hours PRN Itching  fentaNYL (3 MICROgram(s)/mL) + BUpivacaine 0.01% in 0.9% Sodium Chloride PCEA Rescue Clinician Bolus 5 milliLiter(s) Epidural every 15 minutes PRN Mild Pain (1 - 3)  glycerin Suppository - Adult 1 Suppository(s) Rectal at bedtime PRN Constipation  lanolin Ointment 1 Application(s) Topical every 3 hours PRN Sore Nipples  metoclopramide Injectable 10 milliGRAM(s) IV Push once PRN Nausea and/or Vomiting  naloxone Injectable 0.1 milliGRAM(s) IV Push every 3 minutes PRN For ANY of the following changes in patient status:  A. RR LESS THAN 10 breaths per minute, B. Oxygen saturation LESS THAN 90%, C. Sedation score of 6  ondansetron Injectable 4 milliGRAM(s) IV Push every 6 hours PRN Nausea  oxyCODONE    IR 5 milliGRAM(s) Oral every 4 hours PRN Severe Pain (7 - 10)  simethicone 80 milliGRAM(s) Chew every 4 hours PRN Gas      OBJECTIVE:    Assessment of Epidural Catheter Site: 	    [X] Dressing intact	[X] Site non-tender	[X] Site without erythema, discharge, edema  [X] Epidural tubing and connection checked	[X] Gross neurological exam within normal limits  [ ] Catheter removed – tip intact		                          9.4    8.6   )-----------( 163      ( 27 Feb 2019 05:44 )             29.0     Vital Signs Last 24 Hrs  T(C): 36.6 (02-27-19 @ 05:00), Max: 36.8 (02-26-19 @ 19:00)  T(F): 97.9 (02-27-19 @ 05:00), Max: 98.2 (02-26-19 @ 19:00)  HR: 77 (02-27-19 @ 05:00) (68 - 89)  BP: 107/69 (02-27-19 @ 05:00) (89/67 - 133/57)  BP(mean): 74 (02-26-19 @ 15:35) (72 - 87)  RR: 18 (02-27-19 @ 05:00) (13 - 23)  SpO2: 97% (02-27-19 @ 05:00) (96% - 100%)      Sedation Score:	[X] Alert	[ ] Drowsy	[ ] Arousable  [ ] Asleep  [ ] Unresponsive    Side Effects:	[ ] None	[ ] Nausea	[ ] Vomiting  [ ] Pruritus  		[ ] Weakness  [X ] Numbness: right thigh  [ ] Other:    ASSESSMENT/ PLAN:    Therapy:                         [X] Continue   [ ] Discontinue   [ ] Change to PRN Analgesics    Documentation and Verification of current medications:  [X] Done	[ ] Not done, not eligible, reason:    Comments:
OB Attending Note      S: Pt feeling well, +F, pain controlled    Physical exam:    Vital Signs Last 24 Hrs  T(C): 36.8 (2019 13:49), Max: 36.8 (2019 13:49)  T(F): 98.2 (2019 13:49), Max: 98.2 (2019 13:49)  HR: 710 (2019 13:49) (68 - 710)  BP: 131/77 (2019 13:49) (106/65 - 131/77)      2019 07:01  -  2019 07:00  --------------------------------------------------------  IN: 1550 mL / OUT: 0 mL / NET: 1550 mL        Gen: NAD  Breast: Soft, nontender, not engorged.  Abdomen: Soft, nontender, no distension , firm uterine fundus at umbilicus.  Incision: Clean, dry, and intact with steri strips  Scant Lochia  Ext: No calf tenderness    LABS:                        9.4    8.6   )-----------( 163      ( 2019 05:44 )             29.0                         Assessment and Plan  POD # 2 s/p  section  Doing well.  Continue Ambulation/OOB/Venodynes/heparin  Cont Pain medications  Regular diet
PA POD # 1 C/S Note    Blood Type:   A Positive        Rubella:  Immune              RPR:Negative    Pain:  Controlled  Complaints:  None  Pt. is ambulating, DTV, passing flatus, & tolerating regular diet.  Lochia:  WNL    VS:  T(C): 36.6 (02-27-19 @ 05:00), Max: 36.8 (02-26-19 @ 19:00)  HR: 77 (02-27-19 @ 05:00) (68 - 89)  BP: 107/69 (02-27-19 @ 05:00) (89/67 - 133/57)  RR: 18 (02-27-19 @ 05:00) (13 - 23)  SpO2: 97% (02-27-19 @ 05:00) (96% - 100%)                        9.4    8.6   )-----------( 163      ( 27 Feb 2019 05:44 )             29.0     Complete Blood Count (02.12.19 @ 14:21)    WBC Count: 7.34 K/uL    Hemoglobin: 11.3 g/dL    Hematocrit: 35.2 %    Platelet Count - Automated: 218 K/uL    Abd:  Soft, non-distended, non-tender            Fundus-firm            Incision- C/D/I-SQ; Dressing removed  Extremities:  Edema - none                         Calf Tenderness - none    Assessment:    34 y.o. G 3  P  2012     POD # 1 S/P Rpt. C/S  with anemia-VSS/Asx-not requiring blood transfusion in stable condition.    PMHx significant for:  Hypothyroidism, C/S, Ectopic Pregnancy tx'ed with Methotrexate  Current Issues:  Anemia-VSS/Asx-not requiring blood transfusion   Plan:  Increase OOB             Cont. Tylenol, Toradol, PCEA, IVF             CBC as above             Cont. Reg. Diet             Cont. PO Care.            Cont. DVT PPx            Iron Supplementation    MEERA Adhikari
Pain Management Attending Addendum    SUBJECTIVE: Patient doing well with PCEA    Therapy:    [X] PCEA    OBJECTIVE:   [X] Pain appropriately controlled    [ ] Other:    Side Effects:  [X] None	             [ ] Nausea              [ ] Pruritis        [ ] Weakness          [ ] Numbness        	[ ] Other:    ASSESSMENT/PLAN:    [ ] Continue current therapy    [X] Therapy changed to:    [X] PRN Analgesics   [ ] IV PCA    Comments:
Pain Management Attending Addendum    SUBJECTIVE: Patient doing well with PCEA    Therapy:    [X] PCEA    OBJECTIVE:   [X] Pain appropriately controlled    [ ] Other:    Side Effects:  [X] None	             [ ] Nausea              [ ] Pruritis        [ ] Weakness          [ ] Numbness        	[ ] Other:    ASSESSMENT/PLAN:    [X] Continue current therapy    [ ] Therapy changed to:    [ ] PRN Analgesics   [ ] IV PCA    Comments:
Postpartum Note-  Section POD#3    Allergies    No Known Allergies      Blood type A   Positive    RPR Negative    Rubella: Immune    S: Patient is a  34y      POD#3 S/P C/Sec  Subjective: Patient w/o complaints, pain is controlled.  Pt is OOB, tolerating PO, passing flatus, voiding. Lochia WNL.     Feeding: Breast/Bottle  O:  Vital Signs Last 24 Hrs  T(C): 36.6 (01 Mar 2019 05:00), Max: 37 (2019 21:10)  T(F): 97.9 (01 Mar 2019 05:00), Max: 98.6 (2019 21:10)  HR: 70 (01 Mar 2019 05:00) (63 - 710)  BP: 125/81 (01 Mar 2019 05:00) (108/73 - 131/77)  RR: 18 (01 Mar 2019 05:00) (17 - 18)  SpO2: 98% (01 Mar 2019 05:00) (96% - 98%)     Gen: NAD  Abdomen: +BS Soft, nontender, non-distended, fundus firm.  Incision: Clean, dry, and intact.  Negative erythema/edema/ecchymosis   Sub Q/Steri  Lochia WNL  Ext:  Neg Edema, Neg Calf tenderness    LABS:                          9.7    8.0   )-----------( 179      ( 01 Mar 2019 05:57 )             28.0
Postpartum Note-  Section POD#2    Allergies    No Known Allergies    Intolerances            S:Patient is a  34y G [id number]   POD#2 S/P C/Sec  Subjective: Patient w/o complaints, pain is controlled.  Pt is OOB, tolerating PO, passing flatus, and voiding. Lochia WNL.     O:  Vital Signs Last 24 Hrs  T(C): 36.7 (2019 05:00), Max: 36.8 (2019 13:11)  T(F): 98 (2019 05:00), Max: 98.3 (2019 13:11)  HR: 73 (2019 05:00) (68 - 86)  BP: 106/69 (2019 05:00) (99/65 - 115/70)  BP(mean): --  RR: 18 (2019 05:00) (18 - 18)  SpO2: 96% (2019 05:00) (96% - 99%)      Gen: NAD  Abdomen: Soft, nontender, non distened, fundus firm.  Incision: Clean, dry, and intact.  Negative erythema/edema/ecchymosis.   SubQ  Lochia WNL  Ext: Neg edema, Neg calf tenderness    LABS:                          9.4    8.6   )-----------( 163      ( 2019 05:44 )             29.0

## 2019-03-01 NOTE — DISCHARGE NOTE OB - MATERIALS PROVIDED
[facility] Hearing Screen Program/Vaccinations/Breastfeeding Mother’s Support Group Information/Guide to Postpartum Care/Discharge Medication Information for Patients and Families Pocket Guide/[facility] Screening Program/[facility]  Immunization Record/Breastfeeding Log/Shaken Baby Prevention Handout/Birth Certificate Instructions/Back To Sleep Handout/Breastfeeding Guide and Packet

## 2019-03-01 NOTE — DISCHARGE NOTE OB - MEDICATION SUMMARY - MEDICATIONS TO STOP TAKING
I will STOP taking the medications listed below when I get home from the hospital:    levothyroxine 50 mcg (0.05 mg) oral tablet  -- 1 tab(s) by mouth once a day

## 2019-03-01 NOTE — PROGRESS NOTE ADULT - PROBLEM SELECTOR PLAN 1
Increase OOB  D/C IVF  D/C PCEA  PO Pain Protocol  Continue Regular Diet
Cont. PP/PO Care
Increase OOB  Regular diet  AM CBC  PO Pain Protocol  Routine Postop/Postpartum care  Discharge Planning      Mirella Garrett PA-C

## 2019-03-01 NOTE — DISCHARGE NOTE OB - CARE PROVIDER_API CALL
Nino Shea)  Obstetrics and Gynecology  [address], [address]  [place] [address]  Phone: [phone]  Fax: [phone]  Follow Up Time:

## 2019-03-01 NOTE — PROGRESS NOTE ADULT - ASSESSMENT
A/P:  34y  POD # 2 S/P  repeat  section, doing well    PMHx: c/s, ectopic, wisdom teeth  Current Issues:
34 y.o. G 3  P  2012     POD # 1 S/P Rpt. C/S  with anemia-VSS/Asx-not requiring blood transfusion in stable condition.
A/P:  34y  POD # 3 S/P  repeat   section, doing well    Current Issues: none  PAST MEDICAL & SURGICAL HISTORY:  No pertinent past medical history  History of

## 2019-03-01 NOTE — DISCHARGE NOTE OB - MEDICATION SUMMARY - MEDICATIONS TO TAKE
I will START or STAY ON the medications listed below when I get home from the hospital:    acetaminophen 325 mg oral tablet  -- 3 tab(s) by mouth every 6 hours  -- Indication: For  delivery delivered    ibuprofen 600 mg oral tablet  -- 1 tab(s) by mouth every 6 hours  -- Indication: For  delivery delivered    ferrous sulfate 325 mg (65 mg elemental iron) oral tablet  -- 1 tab(s) by mouth once a day   -- Indication: For Anemia due to blood loss    Prenatal Multivitamins with Folic Acid 1 mg oral tablet  -- 1 tab(s) by mouth once a day  -- Indication: For  delivery delivered

## 2019-03-01 NOTE — DISCHARGE NOTE OB - PATIENT PORTAL LINK FT
You can access the [facility] Patient Portal, offered by [facility], by registering with the following website: [email or web address]

## 2019-04-10 ENCOUNTER — RESULT REVIEW (OUTPATIENT)
Age: 34
End: 2019-04-10

## 2020-01-27 NOTE — ED PROVIDER NOTE - CARE PLAN
27-Jan-2020 22:43 Principal Discharge DX:	Threatened miscarriage in early pregnancy Principal Discharge DX:	Threatened miscarriage in early pregnancy  Instructions for follow-up, activity and diet:	You were seen in the emergency department for vaginal bleeding during pregnancy. You had an ultrasound that showed no pregnancy, but it may be too early to tell what exactly is the cause. Please follow up with your obgyn in the next 7-10 days.  Return to the emergency department immediately if you experience fever, severe abdominal pain, worsening bleeding, or any other concerning symptoms.

## 2020-08-13 ENCOUNTER — RESULT REVIEW (OUTPATIENT)
Age: 35
End: 2020-08-13

## 2021-08-18 ENCOUNTER — RESULT REVIEW (OUTPATIENT)
Age: 36
End: 2021-08-18

## 2022-08-08 NOTE — DISCHARGE NOTE OB - NSTOBACCOWEBSITE_GEN_A_NCS
Pt was not seen; she had to go to work. She did leave her UDS and  is consistent. Adderall printed x 3mo and left up front for pt to      [place] Website --- [email or web address] website --- [email or web address]

## 2022-11-29 NOTE — DISCHARGE NOTE OB - THE PATIENT HAS
no difficulties [facility] Pregnancy And Lactation Text: This medication has not been assigned a Pregnancy Risk Category. It is unknown if the medication is excreted in breast milk.

## 2025-02-15 ENCOUNTER — APPOINTMENT (OUTPATIENT)
Dept: MAMMOGRAPHY | Facility: CLINIC | Age: 40
End: 2025-02-15
Payer: COMMERCIAL

## 2025-02-15 ENCOUNTER — OUTPATIENT (OUTPATIENT)
Dept: OUTPATIENT SERVICES | Facility: HOSPITAL | Age: 40
LOS: 1 days | End: 2025-02-15
Payer: COMMERCIAL

## 2025-02-15 DIAGNOSIS — Z98.891 HISTORY OF UTERINE SCAR FROM PREVIOUS SURGERY: Chronic | ICD-10-CM

## 2025-02-15 DIAGNOSIS — Z12.31 ENCOUNTER FOR SCREENING MAMMOGRAM FOR MALIGNANT NEOPLASM OF BREAST: ICD-10-CM

## 2025-02-15 DIAGNOSIS — Z00.8 ENCOUNTER FOR OTHER GENERAL EXAMINATION: ICD-10-CM

## 2025-02-15 PROCEDURE — 77063 BREAST TOMOSYNTHESIS BI: CPT | Mod: 26

## 2025-02-15 PROCEDURE — 77063 BREAST TOMOSYNTHESIS BI: CPT

## 2025-02-15 PROCEDURE — 77067 SCR MAMMO BI INCL CAD: CPT | Mod: 26

## 2025-02-15 PROCEDURE — 77067 SCR MAMMO BI INCL CAD: CPT
